# Patient Record
Sex: MALE | Race: OTHER | HISPANIC OR LATINO | Employment: OTHER | URBAN - METROPOLITAN AREA
[De-identification: names, ages, dates, MRNs, and addresses within clinical notes are randomized per-mention and may not be internally consistent; named-entity substitution may affect disease eponyms.]

---

## 2023-10-17 ENCOUNTER — OFFICE VISIT (OUTPATIENT)
Dept: INTERNAL MEDICINE CLINIC | Facility: CLINIC | Age: 48
End: 2023-10-17

## 2023-10-17 VITALS
WEIGHT: 191 LBS | DIASTOLIC BLOOD PRESSURE: 90 MMHG | BODY MASS INDEX: 28.95 KG/M2 | TEMPERATURE: 97.5 F | HEART RATE: 87 BPM | SYSTOLIC BLOOD PRESSURE: 127 MMHG | HEIGHT: 68 IN | OXYGEN SATURATION: 97 %

## 2023-10-17 DIAGNOSIS — L70.9 ACNE, UNSPECIFIED ACNE TYPE: ICD-10-CM

## 2023-10-17 DIAGNOSIS — Z00.00 ANNUAL PHYSICAL EXAM: ICD-10-CM

## 2023-10-17 DIAGNOSIS — Z76.89 ENCOUNTER TO ESTABLISH CARE: Primary | ICD-10-CM

## 2023-10-17 DIAGNOSIS — Z12.11 SCREENING FOR COLON CANCER: ICD-10-CM

## 2023-10-17 RX ORDER — KETOCONAZOLE 20 MG/ML
1 SHAMPOO TOPICAL 2 TIMES WEEKLY
Qty: 2 ML | Refills: 0 | Status: SHIPPED | OUTPATIENT
Start: 2023-10-19 | End: 2023-10-17 | Stop reason: CLARIF

## 2023-10-17 RX ORDER — KETOCONAZOLE 20 MG/ML
1 SHAMPOO TOPICAL DAILY
Qty: 14 ML | Refills: 0 | Status: SHIPPED | OUTPATIENT
Start: 2023-10-17 | End: 2023-10-31

## 2023-10-17 NOTE — PATIENT INSTRUCTIONS
I will refer you to our financial counselors as well as our  to help get you set up with insurance; in the meantime, you can call [facility] to help ensure labs and whatnot are covered ([phone])    I have ordered several labs for you to get done (these are basic screening labs) - you can get these done whenever you want as long as it isn't too expensive    I will send you to GI to talk about getting a colonoscopy done    Let's see you in a month to see how the medication has been working.

## 2023-10-17 NOTE — PROGRESS NOTES
[address] [facility]    NAME: Usha Mcdowell  AGE: 57 y.o. SEX: male  : 1975     DATE: 10/17/2023     Assessment and Plan:     Acneiform skin lesions - patient does have several small, punctate, acneiform lesions scattered across his scalp. He does have some risk factors for skin disease, including frequent hat use as well as remote exposure to occupational chemicals. However, he denies systemic or local symptoms (itching/pain). At this point, differential includes acne vs. tinea capitis. We will plan to treat with benzoyl peroxide as well as a course of ketoconazole. We will plan to reassess him in a month's time; if his lesions are refractory to this, we will plan to trial a course of steroids as well as considering referral to Dermatology for further evaluation. Health maintenance - patient here today to establish care. We have ordered routine screening labs including CBC, CMP, A1c, lipid panel, and HIV/HCV. We will also work to get him set up with our /financial counselors to work on obtaining insurance coverage - in the interim, we have also provided him with the number for [facility] to assist with paying for labwork and other services. Otherwise, we will also place a referral to GI for colonoscopy and to our associated dental clinic so he can establish with them. He was advised to obtain this lab work at his leisure.      Problem List Items Addressed This Visit       Encounter to establish care - Primary    Relevant Orders    Ambulatory Referral to Financial Counseling Program    Ambulatory Referral to Social Work Care Management Program    HIV 1/2 AG/AB w Reflex [facility] for 2 yr old and above    Hepatitis C antibody    Lipid Panel with Direct LDL reflex    HEMOGLOBIN A1C W/ EAG ESTIMATION    Comprehensive metabolic panel     Other Visit Diagnoses       Acne, unspecified acne type        Relevant Medications    benzoyl peroxide (BREVOXYL) 4 % external liquid    ketoconazole (NIZORAL) 2 % shampoo    Screening for colon cancer        Relevant Orders    Ambulatory Referral to Gastroenterology    Annual physical exam        Relevant Orders    Ambulatory Referral to Social Work Care Management Program    CBC and differential    HIV 1/2 AG/AB w Reflex [facility] for 2 yr old and above    Hepatitis C antibody    Lipid Panel with Direct LDL reflex    HEMOGLOBIN A1C W/ EAG ESTIMATION    Comprehensive metabolic panel    Ambulatory Referral to Gastroenterology    Ambulatory referral to [address] [place] [address]            Immunizations and preventive care screenings were discussed with patient today. Appropriate education was printed on patient's after visit summary. Counseling:  Alcohol/drug use: discussed moderation in alcohol intake, the recommendations for healthy alcohol use, and avoidance of illicit drug use. Dental Health: discussed importance of regular tooth brushing, flossing, and dental visits. Injury prevention: discussed safety/seat belts, safety helmets, smoke detectors, carbon dioxide detectors, and smoking near bedding or upholstery. Sexual health: discussed sexually transmitted diseases, partner selection, use of condoms, avoidance of unintended pregnancy, and contraceptive alternatives. Exercise: the importance of regular exercise/physical activity was discussed. Recommend exercise 3-5 times per week for at least 30 minutes. Depression Screening and Follow-up Plan: Patient was screened for depression during today's encounter. They screened negative with a PHQ-2 score of 0. Return in about 4 weeks (around 11/14/2023) for Follow up acneiform rash.      Chief Complaint:     Chief Complaint   Patient presents with    New Patient Visit    Hair/Scalp Problem     Fungus/pimples on back of head comes and goes      History of Present Illness:     Adult Annual Physical   Patient here for a comprehensive physical exam as well as to establish care. He is a 57 y.o. M with no significant past medical history. In addition to establishing care, he would like to have several skin lesions evaluated. In regards to his skin lesions, he reports that these have been ongoing for the last year. 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 He has tried various shampoos and medications in the past, although these have not been helpful; he is unable to remember what medications he has tried. He does note that these symptoms have been intermittently occurring during the past year. These lesions are not painful or pruritic; however, they are somewhat annoying. He denies any other associated symptoms, including hair loss or other systemic symptoms. He has not been evaluated by a dermatologist in the past.     Otherwise, the patient endorses no acute complaints. He is currently uninsured, but is actively working to try and obtain coverage. In the meantime, he is amenable to obtaining basic screening labs. He is also willing to undergo colonoscopy at this time - he would also like to establish with our dental clinic. Diet and Physical Activity  Diet/Nutrition: well balanced diet. Exercise: no formal exercise. Depression Screening  PHQ-2/9 Depression Screening    Little interest or pleasure in doing things: 0 - not at all  Feeling down, depressed, or hopeless: 0 - not at all  PHQ-2 Score: 0  PHQ-2 Interpretation: Negative depression screen       General Health  Sleep: sleeps well and gets 7-8 hours of sleep on average. Hearing: normal bilaterally  Vision: no vision problems. Dental: no dental visits for >1 year and brushes teeth twice daily.         Health  Symptoms include: none    Advanced Care Planning  Do you have an advanced directive? no  Do you have a durable medical power of ? no     Review of Systems:     Review of Systems   Constitutional:  Negative for chills and fever. HENT:  Negative for sore throat. Eyes:  Negative for visual disturbance. Respiratory:  Negative for chest tightness and shortness of breath. Cardiovascular:  Negative for chest pain. Gastrointestinal:  Negative for abdominal pain. Endocrine: Negative for polydipsia and polyphagia. Musculoskeletal:  Negative for arthralgias and myalgias. Skin:  Positive for rash (As described in HPI). Neurological:  Negative for dizziness and light-headedness. Psychiatric/Behavioral:  Negative for dysphoric mood. Past Medical History:     History reviewed. No pertinent past medical history. Past Surgical History:     History reviewed. No pertinent surgical history.    Family History:     Family History   Problem Relation Age of Onset    Heart attack Mother 76    Heart attack Brother 48      Social History:     Social History     Socioeconomic History    Marital status: Single     Spouse name: None    Number of children: None    Years of education: None    Highest education level: None   Occupational History    None   Tobacco Use    Smoking status: Never     Passive exposure: Never    Smokeless tobacco: Never   Vaping Use    Vaping Use: Never used   Substance and Sexual Activity    Alcohol use: Yes     Comment: socially    Drug use: Never    Sexual activity: None   Other Topics Concern    None   Social History Narrative    None     Social Determinants of Health     Financial Resource Strain: Low Risk  (10/17/2023)    Overall Financial Resource Strain (CARDIA)     Difficulty of Paying Living Expenses: Not very hard   Food Insecurity: No Food Insecurity (10/17/2023)    Hunger Vital Sign     Worried About Running Out of Food in the Last Year: Never true     Ran Out of Food in the Last Year: Never true   Transportation Needs: No Transportation Needs (10/17/2023)    PRAPARE - Transportation     Lack of Transportation (Medical): No     Lack of Transportation (Non-Medical): No   Physical Activity: Not on file   Stress: Not on file   Social Connections: Not on file   Intimate Partner Violence: Not on file   Housing Stability: Not on file      Current Medications:     Current Outpatient Medications   Medication Sig Dispense Refill    benzoyl peroxide (BREVOXYL) 4 % external liquid Apply topically 2 (two) times a day 150 mL 2    ketoconazole (NIZORAL) 2 % shampoo Apply 1 Application topically in the morning for 14 days 14 mL 0     No current facility-administered medications for this visit. Allergies:     No Known Allergies   Physical Exam:     /90 (BP Location: Right arm, Patient Position: Sitting, Cuff Size: Large)   Pulse 87   Temp 97.5 °F (36.4 °C) (Temporal)   Ht 5' 8" (1.727 m)   Wt 86.6 kg (191 lb)   SpO2 97%   BMI 29.04 kg/m²     Physical Exam  Vitals reviewed. Constitutional:       General: He is not in acute distress. Appearance: Normal appearance. He is not ill-appearing. HENT:      Head: Normocephalic and atraumatic. Right Ear: Tympanic membrane, ear canal and external ear normal.      Left Ear: Tympanic membrane, ear canal and external ear normal.      Nose: Nose normal.      Mouth/Throat:      Mouth: Mucous membranes are moist.      Pharynx: Oropharynx is clear. Eyes:      Extraocular Movements: Extraocular movements intact. Conjunctiva/sclera: Conjunctivae normal.      Pupils: Pupils are equal, round, and reactive to light. Cardiovascular:      Rate and Rhythm: Normal rate and regular rhythm. Heart sounds: Normal heart sounds. No murmur heard. Pulmonary:      Effort: No respiratory distress. Breath sounds: Normal breath sounds. Abdominal:      General: Abdomen is flat. Bowel sounds are normal. There is no distension. Palpations: Abdomen is soft. Tenderness:  There is no abdominal tenderness. Musculoskeletal:      Cervical back: Normal range of motion. Right lower leg: No edema. Left lower leg: No edema. Skin:     General: Skin is warm and dry. Capillary Refill: Capillary refill takes less than 2 seconds. Findings: Rash present. Comments: Several small, punctate, nontender/nonpruritic acneiform lesions scattered across the scalp - several of these appear somewhat excoriated and one is actively bleeding   Neurological:      Mental Status: He is alert and oriented to person, place, and time. Mental status is at baseline.    Psychiatric:         Mood and Affect: Mood normal.         Behavior: Behavior normal.          Jovana Burnett MD  [address] [facility]

## 2023-10-18 DIAGNOSIS — L70.9 ACNE, UNSPECIFIED ACNE TYPE: Primary | ICD-10-CM

## 2023-12-12 ENCOUNTER — PATIENT OUTREACH (OUTPATIENT)
Dept: INTERNAL MEDICINE CLINIC | Facility: CLINIC | Age: 48
End: 2023-12-12

## 2023-12-12 NOTE — PROGRESS NOTES
SWCM received referral from provider for insurance needs. SWCM completed chart review. SWCM called patient to assist with needs. SWCM unable to reach patient and unable to leave message as voice mail not set up. SWCM will continue to follow up to assist with needs.

## 2023-12-18 ENCOUNTER — PATIENT OUTREACH (OUTPATIENT)
Dept: INTERNAL MEDICINE CLINIC | Facility: CLINIC | Age: 48
End: 2023-12-18

## 2023-12-18 NOTE — LETTER
12/18/23    Estimado/a Murphy Gibbs un coordinador de la atención médica en [facility]  [address] [place] [address] [address]  [place] [address] [phone]. Intentamos comunicarnos con usted por teléfono varias veces. Es importante que se comunique con nosotros al [phone] para que podamos ofrecerle ayuda con shashank necesidades de atención médica.     Atentamente.       Ivonne Bernice  [facility] [employer]   negative...

## 2023-12-18 NOTE — PROGRESS NOTES
SWCM called patient to follow up (x2) on referral from provider for insurance needs.  SWCM called patient to assist with needs. SWCM unable to reach patient and unable to leave message as voice mail not set up. SWCM sent an Unable to Reach letter in Indonesian to patient's listed address.    SWCM will continue to follow up to assist with needs.

## 2025-01-27 ENCOUNTER — APPOINTMENT (OUTPATIENT)
Dept: URGENT CARE | Age: 50
End: 2025-01-27